# Patient Record
Sex: FEMALE | Race: BLACK OR AFRICAN AMERICAN | NOT HISPANIC OR LATINO | Employment: UNEMPLOYED | URBAN - METROPOLITAN AREA
[De-identification: names, ages, dates, MRNs, and addresses within clinical notes are randomized per-mention and may not be internally consistent; named-entity substitution may affect disease eponyms.]

---

## 2024-01-25 ENCOUNTER — OFFICE VISIT (OUTPATIENT)
Dept: URGENT CARE | Facility: CLINIC | Age: 54
End: 2024-01-25
Payer: COMMERCIAL

## 2024-01-25 VITALS
DIASTOLIC BLOOD PRESSURE: 62 MMHG | RESPIRATION RATE: 16 BRPM | BODY MASS INDEX: 45.74 KG/M2 | TEMPERATURE: 97.5 F | HEIGHT: 57 IN | SYSTOLIC BLOOD PRESSURE: 94 MMHG | HEART RATE: 87 BPM | WEIGHT: 212 LBS | OXYGEN SATURATION: 97 %

## 2024-01-25 DIAGNOSIS — K04.7 DENTAL ABSCESS: ICD-10-CM

## 2024-01-25 PROCEDURE — 99203 OFFICE O/P NEW LOW 30 MIN: CPT | Performed by: PHYSICIAN ASSISTANT

## 2024-01-25 PROCEDURE — 3078F DIAST BP <80 MM HG: CPT | Performed by: PHYSICIAN ASSISTANT

## 2024-01-25 PROCEDURE — 3074F SYST BP LT 130 MM HG: CPT | Performed by: PHYSICIAN ASSISTANT

## 2024-01-25 RX ORDER — AMOXICILLIN AND CLAVULANATE POTASSIUM 875; 125 MG/1; MG/1
1 TABLET, FILM COATED ORAL 2 TIMES DAILY
Qty: 20 TABLET | Refills: 0 | Status: SHIPPED | OUTPATIENT
Start: 2024-01-25

## 2024-01-25 RX ORDER — IBUPROFEN 600 MG/1
600 TABLET ORAL EVERY 6 HOURS PRN
Qty: 30 TABLET | Refills: 1 | Status: SHIPPED | OUTPATIENT
Start: 2024-01-25

## 2024-01-25 ASSESSMENT — ENCOUNTER SYMPTOMS
CHILLS: 1
MYALGIAS: 1
NAUSEA: 0
FEVER: 0
VOMITING: 0

## 2024-01-25 NOTE — PROGRESS NOTES
Subjective:     Zak Suarez  is a 54 y.o. female who presents for Bump (X 2 days, bump on chin, requesting for bump to the be drained )      Other  This is a new problem. The current episode started yesterday. Associated symptoms include chills and myalgias. Pertinent negatives include no fever, nausea or vomiting.   Patient presents urgent care noting last 2 days of swelling to area of right jawline.  She notes some associated tooth that is very painful correlating with area of swelling.  She denies history of dental infection but has significant dental decay in this area.  She denies fevers but has had some chills and bodyaches in the last 48 hours.  She complains of headaches.  She denies visual changes.  Denies nausea or vomiting.  She complains of mild discomfort to right ear associated.  She has tried treatment with OTC ibuprofen.  She does have a follow-up appointment with a dentist in around 3 weeks.    Review of Systems   Constitutional:  Positive for chills. Negative for fever.   HENT:  Positive for ear pain (Mild, radiation on right).         Dental decay, facial swelling and painful tooth   Gastrointestinal:  Negative for nausea and vomiting.   Musculoskeletal:  Positive for myalgias.       Medications:    This patient does not have an active medication from one of the medication groupers.    Allergies: Patient has no known allergies.    Problem List: Zka Suarez does not have a problem list on file.    Surgical History:  No past surgical history on file.    Past Social Hx: Zak Suarez  reports that she has been smoking cigarettes. She has never used smokeless tobacco. She reports that she does not currently use drugs. She reports that she does not drink alcohol.     Past Family Hx:  Zak Suarez family history is not on file.     Problem list, medications, and allergies reviewed by myself today in Epic.     Objective:   BP 94/62 (BP Location: Right arm, Patient Position:  "Sitting, BP Cuff Size: Large adult)   Pulse 87   Temp 36.4 °C (97.5 °F) (Temporal)   Resp 16   Ht 1.435 m (4' 8.5\")   Wt 96.2 kg (212 lb)   SpO2 97%   BMI 46.69 kg/m²     Physical Exam  Vitals and nursing note reviewed.   Constitutional:       General: She is not in acute distress.     Appearance: She is well-developed. She is not diaphoretic.   HENT:      Head: Normocephalic and atraumatic.      Jaw: Swelling present. No pain on movement.      Right Ear: Ear canal and external ear normal.      Left Ear: Ear canal and external ear normal.      Nose: Nose normal.   Eyes:      General: Lids are normal. No scleral icterus.        Right eye: No discharge.         Left eye: No discharge.      Conjunctiva/sclera: Conjunctivae normal.   Pulmonary:      Effort: Pulmonary effort is normal. No respiratory distress.   Musculoskeletal:         General: Normal range of motion.      Cervical back: Neck supple.   Skin:     General: Skin is warm and dry.      Coloration: Skin is not pale.      Findings: No erythema.   Neurological:      Mental Status: She is alert and oriented to person, place, and time. She is not disoriented.   Psychiatric:         Speech: Speech normal.         Behavior: Behavior normal.       1 g of Rocephin-tolerates well    Assessment/Plan:   Assessment      1. Dental abscess  - cefTRIAXone (Rocephin) 1 g in lidocaine (Xylocaine) 1 % 4 mL for IM use  - amoxicillin-clavulanate (AUGMENTIN) 875-125 MG Tab; Take 1 Tablet by mouth 2 times a day.  Dispense: 20 Tablet; Refill: 0  - ibuprofen (MOTRIN) 600 MG Tab; Take 1 Tablet by mouth every 6 hours as needed for Moderate Pain or Inflammation.  Dispense: 30 Tablet; Refill: 1    Supportive care is reviewed with patient/caregiver - recommend to push PO fluids and electrolytes, Rx IBU, okay to alternate with Tylenol, full course of antibiotic,  take full course of Rx, take with probiotics, observe for resolution  Return to clinic with lack of resolution or " progression of symptoms.  Follow-up with dentist  ER precautions with worsening    I have worn an N95 mask, gloves and eye protection for the entire encounter with this patient.     Differential diagnosis, natural history, supportive care, and indications for immediate follow-up discussed.

## 2024-02-01 ENCOUNTER — HOSPITAL ENCOUNTER (EMERGENCY)
Facility: MEDICAL CENTER | Age: 54
End: 2024-02-01
Attending: STUDENT IN AN ORGANIZED HEALTH CARE EDUCATION/TRAINING PROGRAM
Payer: MEDICAID

## 2024-02-01 ENCOUNTER — APPOINTMENT (OUTPATIENT)
Dept: RADIOLOGY | Facility: MEDICAL CENTER | Age: 54
End: 2024-02-01
Attending: STUDENT IN AN ORGANIZED HEALTH CARE EDUCATION/TRAINING PROGRAM
Payer: MEDICAID

## 2024-02-01 VITALS
DIASTOLIC BLOOD PRESSURE: 74 MMHG | BODY MASS INDEX: 44.66 KG/M2 | SYSTOLIC BLOOD PRESSURE: 144 MMHG | RESPIRATION RATE: 16 BRPM | HEIGHT: 57 IN | WEIGHT: 207 LBS | OXYGEN SATURATION: 97 % | HEART RATE: 85 BPM | TEMPERATURE: 96.8 F

## 2024-02-01 DIAGNOSIS — S39.012A STRAIN OF LUMBAR REGION, INITIAL ENCOUNTER: ICD-10-CM

## 2024-02-01 DIAGNOSIS — S83.8X1A SPRAIN OF OTHER LIGAMENT OF RIGHT KNEE, INITIAL ENCOUNTER: ICD-10-CM
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
## 2024-02-01 PROCEDURE — 73562 X-RAY EXAM OF KNEE 3: CPT | Mod: RT

## 2024-02-01 PROCEDURE — A9270 NON-COVERED ITEM OR SERVICE: HCPCS | Performed by: STUDENT IN AN ORGANIZED HEALTH CARE EDUCATION/TRAINING PROGRAM

## 2024-02-01 PROCEDURE — 72100 X-RAY EXAM L-S SPINE 2/3 VWS: CPT

## 2024-02-01 PROCEDURE — 700102 HCHG RX REV CODE 250 W/ 637 OVERRIDE(OP): Performed by: STUDENT IN AN ORGANIZED HEALTH CARE EDUCATION/TRAINING PROGRAM

## 2024-02-01 PROCEDURE — 99284 EMERGENCY DEPT VISIT MOD MDM: CPT

## 2024-02-01 RX ORDER — ACETAMINOPHEN 500 MG
1000 TABLET ORAL ONCE
Status: COMPLETED | OUTPATIENT
Start: 2024-02-01 | End: 2024-02-01

## 2024-02-01 RX ADMIN — ACETAMINOPHEN 1000 MG: 500 TABLET, FILM COATED ORAL at 03:45

## 2024-02-01 ASSESSMENT — PAIN DESCRIPTION - PAIN TYPE
TYPE: ACUTE PAIN
TYPE: ACUTE PAIN

## 2024-02-01 NOTE — DISCHARGE PLANNING
Per RN consultation, SW arranged for transportation via [facility] cab for pt, pt was escorted to lobby to wait, per RN notification.    Time of pickup scheduled for: 5:09PM- Jeramy  Model of Car:Gama PARHAM accompanied pt to verify she was picked up in a timely manner, watched pt safely load into vehicle.

## 2024-02-01 NOTE — ED NOTES
Pt discharged to home. Discharge paperwork provided. Education provided by ERP. Reinforced discharge instructions.  Pt was given follow up instructions and prescriptions.  Pt verbalized understanding of all instructions for discharge.   Patient wheeled to Ed lobby ambulatory with steady gait    Ride provided c/o SW. Pt verbalized understanding.     Pt reports able to walk from cab to apt.

## 2024-02-01 NOTE — ED TRIAGE NOTES
Zak Suarez  54 y.o. female  Chief Complaint   Patient presents with    T-5000 GLF     Slipped and fell at the shelter on a recently mopped floor, yesterday morning. - head strike, - thinner, - LOC.    Knee Pain     R knee pain. Worsened since yesterday.    Low Back Pain     Worsened since yesterday.     Pt in wheelchair to triage for above complaint, unable to bear weight. PTA pt received tylenol and ibuprofen with EMS.    Pt is alert, oriented, and follows commands. Pt speaking in full sentences and responds appropriately to questions. No acute distress noted in triage and respirations are even and unlabored.     Pt placed in lobby and educated on triage process. Pt encouraged to alert staff for any changes in condition.    Vitals:    02/01/24 0222   BP: (!) 128/98   Pulse: 93   Resp: 17   Temp: 36.5 °C (97.7 °F)   SpO2: 98%

## 2024-02-01 NOTE — ED NOTES
Patient to blue 16 via wheelchair with ED tech assist, agree with triage note. Patient able to walk from wheelchair to gurney without issues, requests; pillow, blanket and the lights to be dimmed. Provided, chart up for ERP to see.

## 2024-02-01 NOTE — ED PROVIDER NOTES
CHIEF COMPLAINT  Chief Complaint   Patient presents with    T-5000 GLF     Slipped and fell at the shelter on a recently mopped floor, yesterday morning. - head strike, - thinner, - LOC.    Knee Pain     R knee pain. Worsened since yesterday.    Low Back Pain     Worsened since yesterday.       LIMITATION TO HISTORY   Select:     SALMA Suarez is a 54 y.o. female who presents to the Emergency Department for evaluation of low back pain and pain in her right knee that has been ongoing since patient slipped on the wet floor yesterday she reports she did not strike her head did not have loss of consciousness she also reports some lower back pain denies numbness weakness or loss of bladder or bowel control since the fall    OUTSIDE HISTORIAN(S):  Select:    EXTERNAL RECORDS REVIEWED  Select: Other       PAST MEDICAL HISTORY  History reviewed. No pertinent past medical history.  .    SURGICAL HISTORY  History reviewed. No pertinent surgical history.      FAMILY HISTORY  No family history on file.       SOCIAL HISTORY  Social History     Socioeconomic History    Marital status: Single     Spouse name: Not on file    Number of children: Not on file    Years of education: Not on file    Highest education level: Not on file   Occupational History    Not on file   Tobacco Use    Smoking status: Every Day     Current packs/day: 0.50     Types: Cigarettes    Smokeless tobacco: Never   Vaping Use    Vaping Use: Every day    Substances: Nicotine   Substance and Sexual Activity    Alcohol use: Never    Drug use: Not Currently    Sexual activity: Not on file   Other Topics Concern    Not on file   Social History Narrative    Not on file     Social Determinants of Health     Financial Resource Strain: Not on file   Food Insecurity: Not on file   Transportation Needs: Not on file   Physical Activity: Not on file   Stress: Not on file   Social Connections: Not on file   Intimate Partner Violence: Not on file   Housing  "Stability: Not on file         CURRENT MEDICATIONS  No current facility-administered medications on file prior to encounter.     Current Outpatient Medications on File Prior to Encounter   Medication Sig Dispense Refill    amoxicillin-clavulanate (AUGMENTIN) 875-125 MG Tab Take 1 Tablet by mouth 2 times a day. 20 Tablet 0    ibuprofen (MOTRIN) 600 MG Tab Take 1 Tablet by mouth every 6 hours as needed for Moderate Pain or Inflammation. 30 Tablet 1           ALLERGIES  No Known Allergies    PHYSICAL EXAM  VITAL SIGNS:BP (!) 144/74   Pulse 85   Temp 36 °C (96.8 °F) (Temporal)   Resp 16   Ht 1.435 m (4' 8.5\")   Wt 93.9 kg (207 lb)   SpO2 97%   BMI 45.59 kg/m²       GENERAL: Awake, on gurney  HEAD: Normocephalic, atraumatic  NECK: Normal range of motion, non-tender midline cervical spine  EYES: PERRL, + EOMI, conjunctiva white  ENT: Tympanic membranes gray, Mucous membranes moist, oropharynx clear  PULMONARY: Normal effort, clear to auscultation bilaterally  CARDIOVASCULAR: No murmurs, clicks or rubs, peripheral pulses 2+  ABDOMINAL: Soft, non-tender, no pulsatile masses  BACK: no costovertebral tenderness, mild midline tenderness L4  no step off deformity  NEUROLOGICAL: Grossly non-focal neurologic examination, speech normal  EXTREMITIES: Tenderness palpation of the right knee no edema, no signs of extremity trauma  SKIN: Warm and dry  PSYCHIATRIC: Affect is appropriate    DIAGNOSTIC STUDIES / PROCEDURES  EKG      LABS      RADIOLOGY  I independently reviewed and interpreted the images obtained today in the ER.  No obvious fracture    Radiologist interpretation:   DX-LUMBAR SPINE-2 OR 3 VIEWS   Final Result      Degenerative changes without obvious fracture or acute malalignment of the lumbar spine.      DX-KNEE 3 VIEWS RIGHT   Final Result      1.  Small ossific fragment projects at the supralateral aspect of the patella which may represent a bipartite patella.   2.  Small joint effusion.   3.  Degenerative " changes of the knee.           COURSE & MEDICAL DECISION MAKING    ED COURSE:      INTERVENTIONS BY ME:      INITIAL ASSESSMENT, COURSE AND PLAN  Care Narrative:       Patient presenting after mechanical ground-level fall head to toe trauma evaluation notable for trauma to the lower back and right knee patient without neurologic deficits or red flag symptoms of back pain do not feel MRI or CT scan would be of additional utility satisfactory analgesia was achieved in the Emergency Department x-ray notable for mild right knee effusion patient instructed follow-up with her primary care physician for further monitoring care instructed she may need MRI    [place] CT Head Injury/Trauma Rule    RESULT SUMMARY:  CT Unnecessary    The [place] Head CT Rule suggests a head CT is not necessary for this patient     INPUTS:  Age  -> 0 = No  Patient on blood thinners -> 0 = No  Seizure after injury -> 0 =  No  GCS  -> 0 = No  Suspected open or depressed skull fracture -> 0 = No  Any sign of basilar skull fracture? -> 0 = No  ?2 episodes of vomiting -> 0 = No  Age ?65 years -> 0 = No  Retrograde amnesia to the event ? 30 minutes -> 0 = No  ``Dangerous´´ mechanism? -> 0 = No       ADDITIONAL PROBLEM LIST    DISPOSITION AND DISCUSSIONS      FINAL DIAGNOSIS  1. Strain of lumbar region, initial encounter    2. Sprain of other ligament of right knee, initial encounter    3. Ground-level fall             Electronically signed by: Brad Hodge DO ,6:54 AM 02/01/24

## 2024-04-05 ENCOUNTER — APPOINTMENT (OUTPATIENT)
Dept: RADIOLOGY | Facility: MEDICAL CENTER | Age: 54
End: 2024-04-05
Attending: STUDENT IN AN ORGANIZED HEALTH CARE EDUCATION/TRAINING PROGRAM
Payer: MEDICAID

## 2024-04-05 ENCOUNTER — HOSPITAL ENCOUNTER (EMERGENCY)
Facility: MEDICAL CENTER | Age: 54
End: 2024-04-05
Attending: STUDENT IN AN ORGANIZED HEALTH CARE EDUCATION/TRAINING PROGRAM
Payer: MEDICAID

## 2024-04-05 VITALS
DIASTOLIC BLOOD PRESSURE: 82 MMHG | OXYGEN SATURATION: 97 % | SYSTOLIC BLOOD PRESSURE: 131 MMHG | RESPIRATION RATE: 16 BRPM | TEMPERATURE: 98.8 F | HEART RATE: 87 BPM | HEIGHT: 57 IN | BODY MASS INDEX: 63.21 KG/M2 | WEIGHT: 293 LBS

## 2024-04-05 DIAGNOSIS — J34.89 RHINORRHEA: ICD-10-CM

## 2024-04-05 DIAGNOSIS — R05.1 ACUTE COUGH: ICD-10-CM

## 2024-04-05 LAB
ALBUMIN SERPL BCP-MCNC: 3.5 G/DL (ref 3.2–4.9)
ALBUMIN/GLOB SERPL: 1.6 G/DL
ALP SERPL-CCNC: 101 U/L (ref 30–99)
ALT SERPL-CCNC: 12 U/L (ref 2–50)
ANION GAP SERPL CALC-SCNC: 10 MMOL/L (ref 7–16)
AST SERPL-CCNC: 16 U/L (ref 12–45)
BASOPHILS # BLD AUTO: 0.5 % (ref 0–1.8)
BASOPHILS # BLD: 0.04 K/UL (ref 0–0.12)
BILIRUB SERPL-MCNC: <0.2 MG/DL (ref 0.1–1.5)
BUN SERPL-MCNC: 27 MG/DL (ref 8–22)
CALCIUM ALBUM COR SERPL-MCNC: 9.1 MG/DL (ref 8.5–10.5)
CALCIUM SERPL-MCNC: 8.7 MG/DL (ref 8.5–10.5)
CHLORIDE SERPL-SCNC: 107 MMOL/L (ref 96–112)
CO2 SERPL-SCNC: 25 MMOL/L (ref 20–33)
CREAT SERPL-MCNC: 0.71 MG/DL (ref 0.5–1.4)
EKG IMPRESSION: NORMAL
EOSINOPHIL # BLD AUTO: 0.1 K/UL (ref 0–0.51)
EOSINOPHIL NFR BLD: 1.2 % (ref 0–6.9)
ERYTHROCYTE [DISTWIDTH] IN BLOOD BY AUTOMATED COUNT: 45.2 FL (ref 35.9–50)
FLUAV RNA SPEC QL NAA+PROBE: NEGATIVE
FLUBV RNA SPEC QL NAA+PROBE: NEGATIVE
GFR SERPLBLD CREATININE-BSD FMLA CKD-EPI: 100 ML/MIN/1.73 M 2
GLOBULIN SER CALC-MCNC: 2.2 G/DL (ref 1.9–3.5)
GLUCOSE SERPL-MCNC: 109 MG/DL (ref 65–99)
HCT VFR BLD AUTO: 44 % (ref 37–47)
HGB BLD-MCNC: 14 G/DL (ref 12–16)
IMM GRANULOCYTES # BLD AUTO: 0.05 K/UL (ref 0–0.11)
IMM GRANULOCYTES NFR BLD AUTO: 0.6 % (ref 0–0.9)
LYMPHOCYTES # BLD AUTO: 2.97 K/UL (ref 1–4.8)
LYMPHOCYTES NFR BLD: 35.8 % (ref 22–41)
MCH RBC QN AUTO: 27.2 PG (ref 27–33)
MCHC RBC AUTO-ENTMCNC: 31.8 G/DL (ref 32.2–35.5)
MCV RBC AUTO: 85.6 FL (ref 81.4–97.8)
MONOCYTES # BLD AUTO: 0.73 K/UL (ref 0–0.85)
MONOCYTES NFR BLD AUTO: 8.8 % (ref 0–13.4)
NEUTROPHILS # BLD AUTO: 4.41 K/UL (ref 1.82–7.42)
NEUTROPHILS NFR BLD: 53.1 % (ref 44–72)
NRBC # BLD AUTO: 0 K/UL
NRBC BLD-RTO: 0 /100 WBC (ref 0–0.2)
PLATELET # BLD AUTO: 306 K/UL (ref 164–446)
PMV BLD AUTO: 10 FL (ref 9–12.9)
POTASSIUM SERPL-SCNC: 3.8 MMOL/L (ref 3.6–5.5)
PROT SERPL-MCNC: 5.7 G/DL (ref 6–8.2)
RBC # BLD AUTO: 5.14 M/UL (ref 4.2–5.4)
RSV RNA SPEC QL NAA+PROBE: NEGATIVE
SARS-COV-2 RNA RESP QL NAA+PROBE: NOTDETECTED
SODIUM SERPL-SCNC: 142 MMOL/L (ref 135–145)
TROPONIN T SERPL-MCNC: <6 NG/L (ref 6–19)
WBC # BLD AUTO: 8.3 K/UL (ref 4.8–10.8)

## 2024-04-05 PROCEDURE — 36415 COLL VENOUS BLD VENIPUNCTURE: CPT

## 2024-04-05 PROCEDURE — 93005 ELECTROCARDIOGRAM TRACING: CPT | Performed by: STUDENT IN AN ORGANIZED HEALTH CARE EDUCATION/TRAINING PROGRAM

## 2024-04-05 PROCEDURE — 71045 X-RAY EXAM CHEST 1 VIEW: CPT

## 2024-04-05 PROCEDURE — 700102 HCHG RX REV CODE 250 W/ 637 OVERRIDE(OP): Mod: UD | Performed by: STUDENT IN AN ORGANIZED HEALTH CARE EDUCATION/TRAINING PROGRAM

## 2024-04-05 PROCEDURE — 99284 EMERGENCY DEPT VISIT MOD MDM: CPT

## 2024-04-05 PROCEDURE — 93005 ELECTROCARDIOGRAM TRACING: CPT

## 2024-04-05 PROCEDURE — 0241U HCHG SARS-COV-2 COVID-19 NFCT DS RESP RNA 4 TRGT ED POC: CPT

## 2024-04-05 PROCEDURE — 84484 ASSAY OF TROPONIN QUANT: CPT

## 2024-04-05 PROCEDURE — 85025 COMPLETE CBC W/AUTO DIFF WBC: CPT

## 2024-04-05 PROCEDURE — 80053 COMPREHEN METABOLIC PANEL: CPT

## 2024-04-05 PROCEDURE — A9270 NON-COVERED ITEM OR SERVICE: HCPCS | Mod: UD | Performed by: STUDENT IN AN ORGANIZED HEALTH CARE EDUCATION/TRAINING PROGRAM

## 2024-04-05 RX ORDER — IBUPROFEN 600 MG/1
600 TABLET ORAL ONCE
Status: COMPLETED | OUTPATIENT
Start: 2024-04-05 | End: 2024-04-05

## 2024-04-05 RX ORDER — IBUPROFEN 600 MG/1
600 TABLET ORAL EVERY 6 HOURS PRN
Qty: 30 TABLET | Refills: 0 | Status: SHIPPED | OUTPATIENT
Start: 2024-04-05

## 2024-04-05 RX ORDER — ACETAMINOPHEN 500 MG
1000 TABLET ORAL ONCE
Status: COMPLETED | OUTPATIENT
Start: 2024-04-05 | End: 2024-04-05

## 2024-04-05 RX ORDER — BENZONATATE 100 MG/1
100 CAPSULE ORAL 3 TIMES DAILY PRN
Qty: 30 CAPSULE | Refills: 0 | Status: SHIPPED | OUTPATIENT
Start: 2024-04-05

## 2024-04-05 RX ORDER — ACETAMINOPHEN 500 MG
500-1000 TABLET ORAL EVERY 6 HOURS PRN
Qty: 30 TABLET | Refills: 0 | Status: SHIPPED | OUTPATIENT
Start: 2024-04-05

## 2024-04-05 RX ADMIN — IBUPROFEN 600 MG: 600 TABLET, FILM COATED ORAL at 07:00

## 2024-04-05 RX ADMIN — ACETAMINOPHEN 1000 MG: 500 TABLET, FILM COATED ORAL at 07:00

## 2024-04-05 ASSESSMENT — PAIN DESCRIPTION - PAIN TYPE: TYPE: ACUTE PAIN

## 2024-04-05 NOTE — DISCHARGE INSTRUCTIONS
You were seen in the emergency room for evaluation of nasal congestion, passing out, difficulty breathing.  Your chest x-ray shows no signs of pneumonia.  Your lab test showed no evidence of COVID, influenza, RSV.  Your blood work showed that you are not anemic.  There are no electrolyte abnormalities.  I have sent prescriptions for a cough medicine as well as Tylenol and Motrin to your pharmacy.  Please return to the emergency room for any further episodes of passing out or any other concerns.

## 2024-04-05 NOTE — ED NOTES
Discharge instructions given.  All questions answered.  Prescriptions given x3.  Pt to follow-up with PCP, return to ER if symptoms worsen as discussed.  Pt verbalized understanding.  All belongings with pt.  Pt ambulated to [facility].

## 2024-04-05 NOTE — ED TRIAGE NOTES
"Chief Complaint   Patient presents with    Flu Like Symptoms     Nasal congestion/drainage, fevers, chills, body aches x 3 days       Syncope     Witnessed syncopal episode lasting about 1 min per EMS    Shortness of Breath     Onset earlier today. Pt reports hx asthma. Lung sounds diminished per EMS       Pt BIBA from our place for above complaints. Pt arrives [facility] 15.  per EMS.     Pt to triage for above compliant. SOB protocol and covid test ordered.     Pt placed in lobby and educated on triage process. Pt encouraged to alert staff for any changes, pt verbalized understanding.     BP (!) 136/102   Pulse 99   Temp 37 °C (98.6 °F) (Temporal)   Resp 14   Ht 1.448 m (4' 9\")   Wt (!) 148 kg (326 lb)   SpO2 98%   BMI 70.55 kg/m²       "

## 2024-04-05 NOTE — ED NOTES
Patient assisted from lobby to room in per request and then ambulated from [facility] to [facility] and changed into gown independently with steady gait and NAD.  Labs drawn and sent in triage.  EKG complete in triage.    Patient requesting blankets, socks, and to not have BP taken as the cuff is tight and hurts.    Chart up for ERP.

## 2024-04-05 NOTE — ED PROVIDER NOTES
ED Provider Note    CHIEF COMPLAINT  Chief Complaint   Patient presents with    Flu Like Symptoms     Nasal congestion/drainage, fevers, chills, body aches x 3 days       Syncope     Witnessed syncopal episode lasting about 1 min per EMS    Shortness of Breath     Onset earlier today. Pt reports hx asthma. Lung sounds diminished per EMS       EXTERNAL RECORDS REVIEWED  Outpatient Notes Patient seen at urgent care for dental pain 01/25/2024    HPI/ROS  LIMITATION TO HISTORY   Select: : None  OUTSIDE HISTORIAN(S):  None    Zak Suarez is a 54 y.o. female who presents for evaluation of runny nose, fever, body aches for the past three days. She also has a cough which makes her feel short of breath at times. She lives at the homeless shelter and states that everyone there is sick. She also notes she passed out today after she was feeling dizzy and nauseated. She has never passed out before. She denies any numbness, tingling, weakness. She has some chest pain with coughing. She states she has not had a menstrual cycle in over 3 years. She denies any history of syncope, no history of heart problems. She takes no daily medications.    PAST MEDICAL HISTORY   has a past medical history of Asthma, CVA (cerebral vascular accident) (HCC), Diabetes (HCC), and Hypertension.    SURGICAL HISTORY  patient denies any surgical history    FAMILY HISTORY  History reviewed. No pertinent family history.    SOCIAL HISTORY  Social History     Tobacco Use    Smoking status: Every Day     Current packs/day: 0.50     Types: Cigarettes    Smokeless tobacco: Never   Vaping Use    Vaping Use: Every day    Substances: Nicotine   Substance and Sexual Activity    Alcohol use: Never    Drug use: Not Currently    Sexual activity: Not on file       CURRENT MEDICATIONS  Home Medications       Reviewed by Dulce Fung R.N. (Registered Nurse) on 04/05/24 at 0311  Med List Status: Partial     Medication Last Dose Status  "  amoxicillin-clavulanate (AUGMENTIN) 875-125 MG Tab  Active   ibuprofen (MOTRIN) 600 MG Tab  Active                    ALLERGIES  No Known Allergies    PHYSICAL EXAM  VITAL SIGNS: BP (!) 136/102   Pulse 99   Temp 37 °C (98.6 °F) (Temporal)   Resp 14   Ht 1.448 m (4' 9\")   Wt (!) 148 kg (326 lb)   SpO2 98%   BMI 70.55 kg/m²      Constitutional: Well developed, Well nourished, No acute distress, Non-toxic appearance. Resting comfortably, speaking full sentences  HEENT: Normocephalic, Atraumatic,  external ears normal, pharynx pink,  Mucous  Membranes moist, Clear rhinorrhea to bilateral nares. No trismus, no uvular deviation  Eyes: PERRL, EOMI, Conjunctiva normal, No discharge.   Neck: Normal range of motion, No tenderness, Supple, No stridor.   Cardiovascular: Regular Rate and Rhythm, No murmurs,  rubs, or gallops.   Thorax & Lungs: Lungs clear to auscultation bilaterally, No respiratory distress, No wheezes, rhales or rhonchi, No chest wall tenderness.   Abdomen: Bowel sounds normal, Soft, non tender, non distended,  No pulsatile masses., no rebound guarding or peritoneal signs.   Skin: Warm, Dry, No erythema, No rash,   Back:  No CVA tenderness,  No spinal tenderness, bony crepitance step offs or instability.   Extremities: Equal, intact distal pulses, No cyanosis, clubbing or edema,  No tenderness.   Neurologic: Alert and oriented, No focal deficits noted  Psychiatric: Affect normal, Judgment normal, Mood normal.      EKG/LABS  Results for orders placed or performed during the hospital encounter of 04/05/24   CBC with Differential   Result Value Ref Range    WBC 8.3 4.8 - 10.8 K/uL    RBC 5.14 4.20 - 5.40 M/uL    Hemoglobin 14.0 12.0 - 16.0 g/dL    Hematocrit 44.0 37.0 - 47.0 %    MCV 85.6 81.4 - 97.8 fL    MCH 27.2 27.0 - 33.0 pg    MCHC 31.8 (L) 32.2 - 35.5 g/dL    RDW 45.2 35.9 - 50.0 fL    Platelet Count 306 164 - 446 K/uL    MPV 10.0 9.0 - 12.9 fL    Neutrophils-Polys 53.10 44.00 - 72.00 %    " Lymphocytes 35.80 22.00 - 41.00 %    Monocytes 8.80 0.00 - 13.40 %    Eosinophils 1.20 0.00 - 6.90 %    Basophils 0.50 0.00 - 1.80 %    Immature Granulocytes 0.60 0.00 - 0.90 %    Nucleated RBC 0.00 0.00 - 0.20 /100 WBC    Neutrophils (Absolute) 4.41 1.82 - 7.42 K/uL    Lymphs (Absolute) 2.97 1.00 - 4.80 K/uL    Monos (Absolute) 0.73 0.00 - 0.85 K/uL    Eos (Absolute) 0.10 0.00 - 0.51 K/uL    Baso (Absolute) 0.04 0.00 - 0.12 K/uL    Immature Granulocytes (abs) 0.05 0.00 - 0.11 K/uL    NRBC (Absolute) 0.00 K/uL   Comp Metabolic Panel   Result Value Ref Range    Sodium 142 135 - 145 mmol/L    Potassium 3.8 3.6 - 5.5 mmol/L    Chloride 107 96 - 112 mmol/L    Co2 25 20 - 33 mmol/L    Anion Gap 10.0 7.0 - 16.0    Glucose 109 (H) 65 - 99 mg/dL    Bun 27 (H) 8 - 22 mg/dL    Creatinine 0.71 0.50 - 1.40 mg/dL    Calcium 8.7 8.5 - 10.5 mg/dL    Correct Calcium 9.1 8.5 - 10.5 mg/dL    AST(SGOT) 16 12 - 45 U/L    ALT(SGPT) 12 2 - 50 U/L    Alkaline Phosphatase 101 (H) 30 - 99 U/L    Total Bilirubin <0.2 0.1 - 1.5 mg/dL    Albumin 3.5 3.2 - 4.9 g/dL    Total Protein 5.7 (L) 6.0 - 8.2 g/dL    Globulin 2.2 1.9 - 3.5 g/dL    A-G Ratio 1.6 g/dL   ESTIMATED GFR   Result Value Ref Range    GFR (CKD-EPI) 100 >60 mL/min/1.73 m 2   TROPONIN   Result Value Ref Range    Troponin T <6 6 - 19 ng/L   EKG   Result Value Ref Range    Report       [facility] Emergency Dept.    Test Date:  2024  Pt Name:    BRETT CARRASQUILLO                  Department: ER  MRN:        [id number]                      Room:  Gender:     Female                       Technician: [id number]  :        [id number]                   Requested By:ER TRIAGE PROTOCOL  Order #:    [id number]                    Reading MD: Madina Quiroz    Measurements  Intervals                                Axis  Rate:       93                           P:          69  KY:         150                          QRS:        6  QRSD:       92                           T:           40  QT:         389  QTc:        484    Interpretive Statements  Sinus rhythm  Normal axis  No ST or T wave changes  Borderline prolonged QT intervals  No previous ECG available for comparison  Electronically Signed On 04- 05:11:55 PDT by Madina Quiroz     POC CoV-2, FLU A/B, RSV by PCR   Result Value Ref Range    POC Influenza A RNA, PCR Negative Negative    POC Influenza B RNA, PCR Negative Negative    POC RSV, by PCR Negative Negative    POC SARS-CoV-2, PCR NotDetected      I have independently interpreted this EKG    RADIOLOGY  I have independently interpreted the diagnostic imaging associated with this visit and am waiting the final reading from the radiologist.   My preliminary interpretation is as follows: no focal consolidation    Radiologist interpretation:  DX-CHEST-PORTABLE (1 VIEW)   Final Result         1.  No acute cardiopulmonary disease.          COURSE & MEDICAL DECISION MAKING    ASSESSMENT, COURSE AND PLAN  Care Narrative:   This is a 55 yo female who presents for evaluation of flu like illness onset three days and states she passed out this evening. On arrival her vital signs are stable. She is nontoxic in appearance. She is not hypoxic nor is she in respiratory distress.     EKG without evidence of heart block, Brugada syndrome, QT prolongation or arrhythmia. Labs obtained and show no electrolyte abnormality, anemia. She is postmenopausal therefore doubt pregnancy. Viral swab negative. Chest x-ray without focal consolidation to require antibiotics. Consider ACS but troponin less than 6. Consider PE but less likely given no tachycardia or hypoxia, not significant shortness of breath.     Suspect likely viral process. Patient with multiple sick contacts at the homeless shelter who have similar sx. Advise support care as below, patient agreeable to dc plan with no further questions and will be discharged back to [facility]. She has been monitored on pulse ox for the entirety of  her visit and never became hypoxic.     7:10 AM Patient reevaluated. She is sleeping comfortably. She was awoken, discussed results. She states she is feeling better and requests some cough medication prescription. Will send rx for tylenol, motrin and tessalon. Advise supportive care and to come back for any difficulty breathing persistent fever or any other concerns.             ADDITIONAL PROBLEMS MANAGED  Viral illness    DISPOSITION AND DISCUSSIONS  I have discussed management of the patient with the following physicians and TSEFAN's:    None    Discussion of management with other \[facility] [place] [facility]\"" or appropriate source(s): None     Escalation of care considered, and ultimately not performed:acute inpatient care management, however at this time, the patient is most appropriate for outpatient management    Barriers to care at this time, including but not limited to: Patient is homeless.     Decision tools and prescription drugs considered including, but not limited to:  None .     The patient will return for new or worsening symptoms and is stable at the time of discharge.    The patient is referred to a primary physician for blood pressure management, diabetic screening, and for all other preventative health concerns.      DISPOSITION:  Patient will be discharged home in stable condition.    FOLLOW UP:  [facility], Emergency Dept  [address] [place] [address] [id number] [phone]        Your primary care doctor            OUTPATIENT MEDICATIONS:  Discharge Medication List as of 4/5/2024  7:25 AM        START taking these medications    Details   benzonatate (TESSALON) 100 MG Cap Take 1 Capsule by mouth 3 times a day as needed for Cough., Disp-30 Capsule, R-0, Normal      acetaminophen (TYLENOL) 500 MG Tab Take 1-2 Tablets by mouth every 6 hours as needed for Mild Pain., Disp-30 Tablet, R-0, Normal      !! ibuprofen (MOTRIN) 600 MG Tab Take 1 Tablet by mouth every 6 hours as needed for Mild Pain., Disp-30  Tablet, R-0, Normal       !! - Potential duplicate medications found. Please discuss with provider.            FINAL DIAGNOSIS  1. Rhinorrhea    2. Acute cough           Electronically signed by: Madina Quiroz M.D., 4/5/2024 5:11 AM

## 2024-07-17 ENCOUNTER — HOSPITAL ENCOUNTER (OUTPATIENT)
Dept: RADIOLOGY | Facility: MEDICAL CENTER | Age: 54
End: 2024-07-17
Attending: STUDENT IN AN ORGANIZED HEALTH CARE EDUCATION/TRAINING PROGRAM
Payer: MEDICAID

## 2024-07-17 DIAGNOSIS — Z12.31 ENCOUNTER FOR MAMMOGRAM TO ESTABLISH BASELINE MAMMOGRAM: ICD-10-CM

## 2024-07-17 PROCEDURE — 77063 BREAST TOMOSYNTHESIS BI: CPT
